# Patient Record
Sex: FEMALE | Race: BLACK OR AFRICAN AMERICAN
[De-identification: names, ages, dates, MRNs, and addresses within clinical notes are randomized per-mention and may not be internally consistent; named-entity substitution may affect disease eponyms.]

---

## 2017-10-30 ENCOUNTER — HOSPITAL ENCOUNTER (EMERGENCY)
Dept: HOSPITAL 24 - ER | Age: 32
LOS: 1 days | Discharge: ANOTHER HOSPITAL | End: 2017-10-31
Payer: COMMERCIAL

## 2017-10-30 VITALS — BODY MASS INDEX: 38.4 KG/M2

## 2017-10-30 DIAGNOSIS — R10.31: ICD-10-CM

## 2017-10-30 DIAGNOSIS — K57.20: Primary | ICD-10-CM

## 2017-10-30 LAB
ALBUMIN SERPL BCP-MCNC: 2.8 G/DL (ref 3.4–5)
ALP SERPL-CCNC: 121 UNITS/L (ref 46–116)
ALT SERPL W P-5'-P-CCNC: 41 UNITS/L (ref 12–78)
AST SERPL W P-5'-P-CCNC: 22 UNITS/L (ref 15–37)
BASOPHILS # BLD AUTO: 0.1 X10^3/UL (ref 0–0.1)
BASOPHILS NFR BLD AUTO: 0.5 % (ref 0.2–1)
BUN SERPL-MCNC: 7 MG/DL (ref 7–18)
CALCIUM ALBUM COR SERPL-SCNC: (no result) MMOL/L (ref 136–145)
CALCIUM ALBUM COR SERPL-SCNC: 9.9 MG/DL (ref 8.5–10.1)
CALCIUM SERPL-MCNC: 8.9 MG/DL (ref 8.5–10.1)
CHLORIDE SERPL-SCNC: 99 MMOL/L (ref 98–107)
CO2 SERPL-SCNC: 26.6 MMOL/L (ref 21–32)
CREAT SERPL-MCNC: 0.87 MG/DL (ref 0.55–1.02)
EGFR  BLACK RACES: > 60 (ref 60–?)
EOSINOPHIL # BLD AUTO: 0 X10^3/UL (ref 0–0.2)
EOSINOPHIL NFR BLD AUTO: 0.2 % (ref 0.9–2.9)
ERYTHROCYTE [DISTWIDTH] IN BLOOD BY AUTOMATED COUNT: 13.2 % (ref 11.6–16.5)
HCT VFR BLD AUTO: 37.7 % (ref 36–47)
HGB BLD-MCNC: 12.7 G/DL (ref 12–16)
LYMPHOCYTES # BLD AUTO: 1.7 X10^3/UL (ref 1.3–2.9)
LYMPHOCYTES NFR BLD AUTO: 8 % (ref 21–51)
MCH RBC QN AUTO: 29.8 PG (ref 27–34)
MCHC RBC AUTO-ENTMCNC: 33.8 G/DL (ref 33–35)
MCV RBC AUTO: 88.1 FL (ref 80–100)
MONOCYTES # BLD AUTO: 1.9 X10^3/UL (ref 0.3–0.8)
MONOCYTES NFR BLD AUTO: 8.9 % (ref 0–13)
NEUTROPHILS # BLD AUTO: 17.8 X10^3/UL (ref 2.2–4.8)
NEUTROPHILS NFR BLD AUTO: 82.4 % (ref 42–75)
NEUTS BAND NFR BLD: 5 % (ref 0–10)
PLAT MORPH BLD: NORMAL
PLATELET # BLD: 302 X10^3/UL (ref 150–450)
PMV BLD AUTO: 8.5 FL (ref 7.4–11)
PROT SERPL-MCNC: 7.9 G/DL (ref 6.4–8.2)
RBC # BLD AUTO: 4.28 X10^6/UL (ref 3.5–5.4)
RBC MORPH BLD: NORMAL
SODIUM SERPL-SCNC: 135 MMOL/L (ref 136–145)
WBC NRBC COR # BLD AUTO: 21.6 X10^3/UL (ref 3.6–10)

## 2017-10-30 PROCEDURE — 86140 C-REACTIVE PROTEIN: CPT

## 2017-10-30 PROCEDURE — 80053 COMPREHEN METABOLIC PANEL: CPT

## 2017-10-30 PROCEDURE — 36415 COLL VENOUS BLD VENIPUNCTURE: CPT

## 2017-10-30 PROCEDURE — 74177 CT ABD & PELVIS W/CONTRAST: CPT

## 2017-10-30 PROCEDURE — 96374 THER/PROPH/DIAG INJ IV PUSH: CPT

## 2017-10-30 PROCEDURE — 96375 TX/PRO/DX INJ NEW DRUG ADDON: CPT

## 2017-10-30 PROCEDURE — 74022 RADEX COMPL AQT ABD SERIES: CPT

## 2017-10-30 PROCEDURE — 99284 EMERGENCY DEPT VISIT MOD MDM: CPT

## 2017-10-30 PROCEDURE — 96365 THER/PROPH/DIAG IV INF INIT: CPT

## 2017-10-30 PROCEDURE — 99285 EMERGENCY DEPT VISIT HI MDM: CPT

## 2017-10-30 PROCEDURE — 85025 COMPLETE CBC W/AUTO DIFF WBC: CPT

## 2017-10-30 NOTE — RAD
EXAM: Abdomen series and Chest x-ray



INDICATION:  

Abdominal pain

 

COMPARISION:  

No priors

 

TECHNIQUE:  

Abdomen flat and upright, two views and PA view of the chest, single view



FINDINGS:  

The lungs are clear. No pneumothorax or pleural effusion. The cardiac silhouette and mediastinum are 
normal. The bowel gas pattern is nonobstructed. No abnormal mass effect or calcification. The regiona
l skeleton is intact. No free air is seen under the hemidiaphragms.



IMPRESSION:  

Normal abdominal series and chest x-ray.









Reported By:Electronically Signed by NICOLAS LEVINE MD at 10/30/2017 10:46:36 PM

## 2017-10-30 NOTE — DR.GENAD
HPI





- PCP


Primary Care Physician: nfd





- Complaint/Symptoms


Chief Complaint:: pt states" i went to [place] ER on friday they said i had 

diverticulosis and gave me cipro flagyl and percocet but i'm still hurting and 

nauseaed"





- Source


History Provided: Patient





- Mode of Arrival


Mode of Arrival: Ambulatory





- Timing


Onset of Chief Complaint: 10/25/17





PMH





- PMH


Past Medical History: No


Past Surgical History: Yes


Surgical History: Cholecystectomy





- Family History


History of Family Medical Conditions: Yes


Family Medical History: Diabetes Mellitus, Cancer, Hypertension





- Social History


Type of Tobacco Use: Cigars


Does any household member use tobacco: No


Alcohol Use: Occasionally


Do you use any recreational Drugs:: No


Lives With: Family


Lives Where: Home





- infectious screening


In the last 2 months have you had wt loss of >10#?: NO


Have you had fever, night sweats or hemotysis?: No


Have you traveled outside the country in the last 6 months?: No


Isolation: Standard





ROS





- Review of Systems


Eyes: No Symptoms Reported


ENTM: No Symptoms Reported


Respiratoy: No Symptoms Reported


Cardiovascular: No Symptoms Reported


Gastrointestinal/Abdominal: No Symptoms Reported


Genitourinary: No Symptoms Reported


Neurological: No Symptoms Reported, Emotional Problems


Musculoskeletal: No Symptoms Reported


Integumentary: No Symptoms Reported


Hematologic/Lymphatic: No Symptoms Reported


Endocrine: No Symptoms Reported


Psychiatric: No Symptoms Reported


All Other Systems: Reviewed and Negative





PE





- Vital Signs


Vitals: 


 





Temperature                      100 F


Pulse Rate [Right Brachial]      96


Pulse Rate                       108


Respiratory Rate                 16


Blood Pressure [Right Arm]       138/78


Blood Pressure                   120/74


O2 Sat by Pulse Oximetry         100











- General


Limitations: No Limitations, Language Barrier


General Appearance: Alert, In No Apparent Distress





- Head


Head Exam: Normal Inspection, Atraumatic





- Eyes


Eye exam: Normal Appearance, PERRL





- ENT


ENT Exam: Normal Exam


External Ear Exam: Normal External Inspection


TM/Canal Exam: Bilateral Normal


Nose Exam: Normal Nose Exam


Mouth Exam: Normal Inspection


Throat Exam: Normal Inspection





- Chest


Chest Inspection: Normal Inspection





- Respiratory


Respiratory Exam: Normal Lung Sounds Bilat


Respiratory Exam: Bilateral Clear to Auscultation





- Cardiovascular


Cardiovascular Exam: Regular Rate, Normal Rhythm





- Abdominal Exam


Abdominal Exam: Normal Inspection


Abdominal Tenderness: RLQ, LUQ, Diffuse





- Extremities


Extremities Exam: Normal Inspection





- Back


Back Exam: Normal Inspection





- Neurologic


Neurological Exam: Alert, Oriented X3, CN II-XII Intact





- Psychiatric


Psychiatric Exam: Normal Affect





- Skin


Skin Exam: Warm, Dry, Intact





Course





- Reevaluation


1st: Improved





- Consultation


Called: 02:50 (Dr Balderas accepted for transfer)





ROR





- Labs Reviewed


Result Diagrams: 


 10/30/17 22:10





 10/30/17 22:10


Laboratory: 


 











WBC  21.6 X10^3/uL (3.6-10.0)  H  10/30/17  22:10    


 


RBC  4.28 X10^6/uL (3.5-5.4)   10/30/17  22:10    


 


Hgb  12.7 g/dL (12.0-16.0)   10/30/17  22:10    


 


Hct  37.7 % (36.0-47.0)   10/30/17  22:10    


 


MCV  88.1 fL (80.0-100.0)   10/30/17  22:10    


 


MCH  29.8 pg (27.0-34.0)   10/30/17  22:10    


 


MCHC  33.8 g/dL (33.0-35.0)   10/30/17  22:10    


 


RDW  13.2 % (11.6-16.5)   10/30/17  22:10    


 


Plt Count  302 X10^3/uL (150.0-450.0)   10/30/17  22:10    


 


Plt Count Comment  Adequate  (ADEQUATE)   10/30/17  22:10    


 


MPV  8.5 fL (7.4-11.0)   10/30/17  22:10    


 


Neut %  82.4 % (42.0-75.0)  H  10/30/17  22:10    


 


Lymph %  8.0 % (21.0-51.0)  L  10/30/17  22:10    


 


Mono %  8.9 % (0.0-13.0)   10/30/17  22:10    


 


Eos %  0.2 % (0.9-2.9)  L  10/30/17  22:10    


 


Baso %  0.5 % (0.2-1.0)   10/30/17  22:10    


 


Neut #  17.8 x10^3/uL (2.2-4.8)  H  10/30/17  22:10    


 


Lymph #  1.7 X10^3/uL (1.3-2.9)   10/30/17  22:10    


 


Mono #  1.9 x10^3/uL (0.3-0.8)  H  10/30/17  22:10    


 


Eos #  0.0 x10^3/uL (0.0-0.2)   10/30/17  22:10    


 


Baso #  0.1 X10^3/uL (0.0-0.1)   10/30/17  22:10    


 


Absolute Nucleated RBC  0.0 /100WBC  10/30/17  22:10    


 


Total Counted  100   10/30/17  22:10    


 


Neutrophils % (Manual)  80 % (39-76)  H  10/30/17  22:10    


 


Band Neutrophils %  5 % (0-10)   10/30/17  22:10    


 


Lymphocytes % (Manual)  9 % (13-43)  L  10/30/17  22:10    


 


Monocytes % (Manual)  6 % (4-9)   10/30/17  22:10    


 


Plt Morphology Comment  Normal  (NORMAL)   10/30/17  22:10    


 


RBC Morphology  Normal  (NORMAL)   10/30/17  22:10    


 


Sodium  135 mmol/L (136-145)  L  10/30/17  22:10    


 


Corrected Sodium  TNP   10/30/17  22:10    


 


Potassium  3.8 mmol/L (3.5-5.1)   10/30/17  22:10    


 


Chloride  99 mmol/L ()   10/30/17  22:10    


 


Carbon Dioxide  26.6 mmol/L (21-32)   10/30/17  22:10    


 


BUN  7 mg/dL (7-18)   10/30/17  22:10    


 


Creatinine  0.87 mg/dL (0.55-1.02)   10/30/17  22:10    


 


Est GFR (MDRD) Af Amer  > 60  (>60)   10/30/17  22:10    


 


Est GFR (MDRD) Non-Af  > 60  (>60)   10/30/17  22:10    


 


Glucose  104 mg/dL (65-99)  H  10/30/17  22:10    


 


Calcium  8.9 mg/dL (8.5-10.1)   10/30/17  22:10    


 


Corrected Calcium  9.9 mg/dL (8.5-10.1)   10/30/17  22:10    


 


Total Bilirubin  0.70 mg/dL (0.2-1.0)   10/30/17  22:10    


 


AST  22 Units/L (15-37)   10/30/17  22:10    


 


ALT  41 Units/L (12-78)   10/30/17  22:10    


 


Alkaline Phosphatase  121 Units/L ()  H  10/30/17  22:10    


 


C-Reactive Protein  213.50 mg/L (0-3.0)  H  10/30/17  22:10    


 


Total Protein  7.9 g/dL (6.4-8.2)   10/30/17  22:10    


 


Albumin  2.8 g/dL (3.4-5.0)  L  10/30/17  22:10    


 


Globulin  5.1 g/dL (2.5-4.5)  H  10/30/17  22:10    


 


Albumin/Globulin Ratio  0.5 Ratio (1.1-2.1)  L  10/30/17  22:10    














- XRAY


XRAY Interpreted by: Radiologist (KUB: normal chest and abdomen)





- Diagnosis


Discharge Problem: 


 Diverticulitis of large intestine with abscess without bleeding








- Discharge Plan


Condition: Stable





- Follow ups/Referrals


Follow ups/Referrals: 


[facility],None [Primary Care Provider] - 3 days





- Instructions

## 2017-10-31 VITALS — SYSTOLIC BLOOD PRESSURE: 136 MMHG | DIASTOLIC BLOOD PRESSURE: 78 MMHG

## 2017-10-31 NOTE — CT
EXAM: CT ABDOMEN AND PELVIS WITH CONTRAST 



INDICATION:  

Diverticulitis

 

COMPARISION:  

No priors available for comparison

 

TECHNIQUE:  

Axial CT examination of the abdomen and pelvis was performed with intravenous contrast. The patient r
eceived  intravenous contrast without adverse reaction. Coronal and sagittal reconstructions were cre
ated using the axial data.

 

FINDINGS:  

The lung bases are clear. The liver, spleen, pancreas, adrenal glands, and kidneys are normal. The ga
llbladder has been removed.  There is no evidence of biliary ductal dilatation. The aorta and inferio
r vena cava are normal in caliber. The bowel loops are nonobstructed.



No abnormal mass, lymphadenopathy, or fluid collection.



Urinary bladder is normal.  There is thickening of the sigmoid colon wall in the left lower quadrant 
within the left iliac fossa. Adjacent to the colon wall thickening posteriorly is an intramuscular ab
scess which includes the anterior aspect of the iliacus muscle and the adjacent psoas muscle. The mul
tiloculated abscess measures 4.6 cm in diameter in the iliacus muscle and 2.5 cm in the adjacent psoa
s muscle.



The regional skeleton is intact.



IMPRESSION:  

Findings are consistent with sigmoid colon diverticulitis with an adjacent multiloculated abscess inv
olving the iliacus muscle and psoas muscle in the left iliac fossa.







Reported By:Electronically Signed by NICOLAS LEVINE MD at 10/31/2017 1:52:36 AM

## 2017-11-17 ENCOUNTER — HOSPITAL ENCOUNTER (EMERGENCY)
Dept: HOSPITAL 24 - ER | Age: 32
Discharge: HOME | End: 2017-11-17
Payer: COMMERCIAL

## 2017-11-17 VITALS — BODY MASS INDEX: 38.5 KG/M2

## 2017-11-17 VITALS — SYSTOLIC BLOOD PRESSURE: 147 MMHG | DIASTOLIC BLOOD PRESSURE: 83 MMHG

## 2017-11-17 DIAGNOSIS — R51: Primary | ICD-10-CM

## 2017-11-17 PROCEDURE — 96372 THER/PROPH/DIAG INJ SC/IM: CPT

## 2017-11-17 PROCEDURE — 99282 EMERGENCY DEPT VISIT SF MDM: CPT

## 2017-11-17 NOTE — DR.GENAD
HPI





- PCP


Primary Care Physician: nfd





- Complaint/Symptoms


Chief Complaint Doctors Comments: Patient presents with complaint of bitemporal 

headache onset this AM. She denies fever or vomiting, had little diarrhea. She 

also comlains of intermittent leg cramps.


Chief Complaint:: headache leg pain unknown knots





- Source


History Provided: Patient





- Mode of Arrival


Mode of Arrival: Ambulatory





- Timing


Onset of Chief Complaint: 11/10/17





PMH





- PMH


Past Medical History: Yes


Past Medical History: Hypertension


Past Surgical History: Yes


Surgical History: Abdominal Surgery, Cholecystectomy





- Family History


History of Family Medical Conditions: Yes


Family Medical History: Diabetes Mellitus, Cancer, Hypertension





- Social History


Does patient currently use any type of tobacco product: Yes


Have you used tobacco products in the last 12 months: Yes


Type of Tobacco Use: Cigars


Does any household member use tobacco: No


Alcohol Use: Occasionally


Do you use any recreational Drugs:: No


Lives With: Family


Lives Where: Home





- infectious screening


In the last 2 months have you had wt loss of >10#?: NO


Have you had fever, night sweats or hemotysis?: No


Have you traveled outside the country in the last 6 months?: No


Isolation: Standard





ROS





- Review of Systems


Eyes: No Symptoms Reported


ENTM: No Symptoms Reported


Respiratoy: No Symptoms Reported


Cardiovascular: No Symptoms Reported


Gastrointestinal/Abdominal: No Symptoms Reported


Genitourinary: No Symptoms Reported


Neurological: No Symptoms Reported


Musculoskeletal: No Symptoms Reported


Integumentary: No Symptoms Reported


Hematologic/Lymphatic: No Symptoms Reported


Endocrine: No Symptoms Reported


Psychiatric: No Symptoms Reported





PE





- Vital Signs


Vitals: 





 





Temperature                      97.9 F


Pulse Rate                       90


Respiratory Rate                 16


Blood Pressure [Right Arm]       136/78


Blood Pressure                   159/94


O2 Sat by Pulse Oximetry         99











- General


General Appearance: Alert





- Head


Head Exam: Normal Inspection, Atraumatic





- Eyes


Eye exam: Normal Appearance, PERRL, EOMI





- ENT


ENT Exam: Normal Exam


External Ear Exam: Normal External Inspection


TM/Canal Exam: Bilateral Normal


Nose Exam: Normal Nose Exam


Mouth Exam: Normal Inspection


Throat Exam: Normal Inspection





- Neck


Neck Exam: Normal Inspection, Full ROM





- Chest


Chest Inspection: Normal Inspection





- Cardiovascular


Cardiovascular Exam: Regular Rate





- Abdominal Exam


Abdominal Exam: Normal Inspection, Normal Bowel Sounds


Abdominal Tenderness: negative: RUQ, RLQ, LUQ, LLQ, Epigastrium, Suprapubic, 

Diffuse, Mild, Moderate, Severe, Other





- Extremities


Extremities Exam: Normal Inspection, Full ROM





- Back


Back Exam: Normal Inspection, Full ROM





- Neurologic


Neurological Exam: Alert, Oriented X3, CN II-XII Intact





- Psychiatric


Psychiatric Exam: Normal Affect





- Skin


Skin Exam: Warm, Dry, Intact, Other (subcutaneous fat pellet right abdomen)





- Diagnosis


Discharge Problem: 


Headache


Qualifiers:


 Headache type: unspecified Headache chronicity pattern: acute headache 

Intractability: not intractable Qualified Code(s): R51 - Headache








- Discharge Plan


Condition: Stable





- Follow ups/Referrals


Follow ups/Referrals: 


NFD,None [Primary Care Provider] - 3 days





- Instructions

## 2018-01-22 ENCOUNTER — HOSPITAL ENCOUNTER (EMERGENCY)
Dept: HOSPITAL 24 - ER | Age: 33
Discharge: HOME | End: 2018-01-22
Payer: COMMERCIAL

## 2018-01-22 VITALS — BODY MASS INDEX: 40.6 KG/M2

## 2018-01-22 VITALS — DIASTOLIC BLOOD PRESSURE: 82 MMHG | SYSTOLIC BLOOD PRESSURE: 124 MMHG
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
## 2018-01-22 DIAGNOSIS — S46.011A: Primary | ICD-10-CM

## 2018-01-22 PROCEDURE — 73030 X-RAY EXAM OF SHOULDER: CPT

## 2018-01-22 PROCEDURE — 99282 EMERGENCY DEPT VISIT SF MDM: CPT

## 2018-01-22 PROCEDURE — 96372 THER/PROPH/DIAG INJ SC/IM: CPT
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
## 2018-01-22 NOTE — RAD
Examination: Left shoulder, two views



History: Recent trauma



Findings: No definite fracture or dislocation or articular deformity. The humeral head appears in nor
mal position although lateral/orthogonal views were not obtained.



Impression: Normal two-view examination left shoulder. See above.



Reported By:Electronically Signed by AIMEE PALACIOS MD at 1/22/2018 7:51:29 PM